# Patient Record
Sex: FEMALE | URBAN - METROPOLITAN AREA
[De-identification: names, ages, dates, MRNs, and addresses within clinical notes are randomized per-mention and may not be internally consistent; named-entity substitution may affect disease eponyms.]

---

## 2019-11-08 ENCOUNTER — MEDICAL CORRESPONDENCE (OUTPATIENT)
Dept: HEALTH INFORMATION MANAGEMENT | Facility: CLINIC | Age: 34
End: 2019-11-08

## 2019-12-10 ENCOUNTER — TELEPHONE (OUTPATIENT)
Dept: FAMILY MEDICINE | Facility: CLINIC | Age: 34
End: 2019-12-10

## 2019-12-10 ENCOUNTER — OFFICE VISIT (OUTPATIENT)
Dept: FAMILY MEDICINE | Facility: CLINIC | Age: 34
End: 2019-12-10
Payer: COMMERCIAL

## 2019-12-10 ENCOUNTER — ANCILLARY PROCEDURE (OUTPATIENT)
Dept: GENERAL RADIOLOGY | Facility: CLINIC | Age: 34
End: 2019-12-10
Attending: PHYSICIAN ASSISTANT
Payer: COMMERCIAL

## 2019-12-10 VITALS
HEIGHT: 64 IN | BODY MASS INDEX: 29.88 KG/M2 | DIASTOLIC BLOOD PRESSURE: 76 MMHG | SYSTOLIC BLOOD PRESSURE: 116 MMHG | TEMPERATURE: 97.9 F | HEART RATE: 79 BPM | WEIGHT: 175 LBS | OXYGEN SATURATION: 97 %
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
## 2019-12-10 DIAGNOSIS — S82.401A TIBIA/FIBULA FRACTURE, RIGHT, CLOSED, INITIAL ENCOUNTER: ICD-10-CM

## 2019-12-10 DIAGNOSIS — M53.3 PAIN IN THE COCCYX: ICD-10-CM

## 2019-12-10 DIAGNOSIS — M54.50 ACUTE BILATERAL LOW BACK PAIN WITHOUT SCIATICA: ICD-10-CM

## 2019-12-10 DIAGNOSIS — T14.8XXA HEMATOMA: ICD-10-CM

## 2019-12-10 DIAGNOSIS — S82.201A TIBIA/FIBULA FRACTURE, RIGHT, CLOSED, INITIAL ENCOUNTER: ICD-10-CM
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
## 2019-12-10 DIAGNOSIS — R07.89 CHEST WALL PAIN: ICD-10-CM

## 2019-12-10 PROCEDURE — 99203 OFFICE O/P NEW LOW 30 MIN: CPT | Performed by: PHYSICIAN ASSISTANT

## 2019-12-10 PROCEDURE — 72220 X-RAY EXAM SACRUM TAILBONE: CPT

## 2019-12-10 ASSESSMENT — MIFFLIN-ST. JEOR: SCORE: 1483.79

## 2019-12-10 ASSESSMENT — PAIN SCALES - GENERAL: PAINLEVEL: MODERATE PAIN (4)
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
## 2019-12-10 NOTE — TELEPHONE ENCOUNTER
Notified patient of the results below.    Patient stated understanding and agreeable with the plan of care. Angie TarangoRN,BSN, [facility].

## 2019-12-10 NOTE — RESULT ENCOUNTER NOTE
Please call patient. X-ray showed no fracture. However, there is some loss of disc space between L5-S1. Likely not contributing to the type of pain she is having.   Liz Box PA-C

## 2019-12-10 NOTE — TELEPHONE ENCOUNTER
Please call pt regarding results below.  [facility]  Team 3 Coordinator     Please call patient. X-ray showed no fracture. However, there is some loss of disc space between L5-S1. Likely not contributing to the type of pain she is having.   Liz Box PA-C

## 2024-05-02 ENCOUNTER — MEDICAL CORRESPONDENCE (OUTPATIENT)
Dept: HEALTH INFORMATION MANAGEMENT | Facility: CLINIC | Age: 39
End: 2024-05-02
Payer: COMMERCIAL